# Patient Record
Sex: FEMALE | Race: WHITE | NOT HISPANIC OR LATINO | Employment: OTHER | URBAN - METROPOLITAN AREA
[De-identification: names, ages, dates, MRNs, and addresses within clinical notes are randomized per-mention and may not be internally consistent; named-entity substitution may affect disease eponyms.]

---

## 2020-07-26 ENCOUNTER — APPOINTMENT (OUTPATIENT)
Dept: RADIOLOGY | Facility: IMAGING CENTER | Age: 60
End: 2020-07-26
Attending: NURSE PRACTITIONER
Payer: COMMERCIAL

## 2020-07-26 ENCOUNTER — OFFICE VISIT (OUTPATIENT)
Dept: URGENT CARE | Facility: CLINIC | Age: 60
End: 2020-07-26
Payer: COMMERCIAL

## 2020-07-26 VITALS
RESPIRATION RATE: 14 BRPM | HEART RATE: 68 BPM | HEIGHT: 68 IN | SYSTOLIC BLOOD PRESSURE: 108 MMHG | OXYGEN SATURATION: 98 % | WEIGHT: 154 LBS | DIASTOLIC BLOOD PRESSURE: 66 MMHG | TEMPERATURE: 98 F | BODY MASS INDEX: 23.34 KG/M2

## 2020-07-26 DIAGNOSIS — S96.912A STRAIN OF LEFT FOOT, INITIAL ENCOUNTER: ICD-10-CM

## 2020-07-26 DIAGNOSIS — M79.672 LEFT FOOT PAIN: ICD-10-CM

## 2020-07-26 DIAGNOSIS — T63.301A SPIDER BITE WOUND, ACCIDENTAL OR UNINTENTIONAL, INITIAL ENCOUNTER: ICD-10-CM

## 2020-07-26 PROCEDURE — 73630 X-RAY EXAM OF FOOT: CPT | Mod: TC,LT | Performed by: NURSE PRACTITIONER

## 2020-07-26 PROCEDURE — 99204 OFFICE O/P NEW MOD 45 MIN: CPT | Performed by: NURSE PRACTITIONER

## 2020-07-26 RX ORDER — CITALOPRAM 40 MG/1
40 TABLET ORAL DAILY
COMMUNITY
End: 2020-07-26

## 2020-07-26 RX ORDER — TRIAMCINOLONE ACETONIDE 1 MG/G
1 OINTMENT TOPICAL 2 TIMES DAILY
Qty: 1 G | Refills: 0 | Status: SHIPPED | OUTPATIENT
Start: 2020-07-26 | End: 2023-08-01

## 2020-07-26 RX ORDER — CELECOXIB 400 MG/1
400 CAPSULE ORAL 2 TIMES DAILY
COMMUNITY
End: 2023-07-17

## 2020-07-26 RX ORDER — ESTROGEN,CON/M-PROGEST ACET 0.45-1.5MG
1 TABLET ORAL DAILY
COMMUNITY
End: 2023-07-17

## 2020-07-26 RX ORDER — TOPIRAMATE 100 MG/1
100 TABLET, FILM COATED ORAL 2 TIMES DAILY
COMMUNITY
End: 2023-07-17

## 2020-07-26 RX ORDER — LEVOTHYROXINE SODIUM 88 UG/1
88 TABLET ORAL
COMMUNITY

## 2020-07-26 ASSESSMENT — ENCOUNTER SYMPTOMS
WEAKNESS: 0
EYE REDNESS: 0
NAUSEA: 0
COUGH: 0
SHORTNESS OF BREATH: 0
SORE THROAT: 0
FEVER: 0
ANOREXIA: 0
VOMITING: 0
DIZZINESS: 0
NUMBNESS: 0
CHILLS: 0
MYALGIAS: 0
JOINT SWELLING: 1

## 2020-07-26 NOTE — PROGRESS NOTES
Subjective:     Holly White  is a 60 y.o. female who presents for Foot Swelling (Left)       Foot Swelling   This is a new problem. The current episode started in the past 7 days (step out of RV and rolled ankle). The problem occurs constantly. The problem has been gradually worsening. Associated symptoms include joint swelling and a rash (spider bite left inner thigh happened yesterday ). Pertinent negatives include no anorexia, chest pain, chills, coughing, fever, myalgias, nausea, numbness, sore throat, vomiting or weakness. The symptoms are aggravated by walking. She has tried NSAIDs and ice for the symptoms. The treatment provided no relief.   Rash   This is a new problem. The current episode started yesterday. The problem is unchanged. Location: left leg. The rash is characterized by redness and itchiness. She was exposed to a spider bite. Associated symptoms include joint pain. Pertinent negatives include no anorexia, cough, fever, shortness of breath, sore throat or vomiting. Past treatments include anti-itch cream. The treatment provided no relief. There is no history of allergies or eczema.   History reviewed. No pertinent past medical history.History reviewed. No pertinent surgical history.  Social History     Socioeconomic History   • Marital status:      Spouse name: Not on file   • Number of children: Not on file   • Years of education: Not on file   • Highest education level: Not on file   Occupational History   • Not on file   Social Needs   • Financial resource strain: Not on file   • Food insecurity     Worry: Not on file     Inability: Not on file   • Transportation needs     Medical: Not on file     Non-medical: Not on file   Tobacco Use   • Smoking status: Never Smoker   • Smokeless tobacco: Never Used   Substance and Sexual Activity   • Alcohol use: Yes   • Drug use: Never   • Sexual activity: Not on file   Lifestyle   • Physical activity     Days per week: Not on file     Minutes  "per session: Not on file   • Stress: Not on file   Relationships   • Social connections     Talks on phone: Not on file     Gets together: Not on file     Attends Yazidism service: Not on file     Active member of club or organization: Not on file     Attends meetings of clubs or organizations: Not on file     Relationship status: Not on file   • Intimate partner violence     Fear of current or ex partner: Not on file     Emotionally abused: Not on file     Physically abused: Not on file     Forced sexual activity: Not on file   Other Topics Concern   • Not on file   Social History Narrative   • Not on file    History reviewed. No pertinent family history. Review of Systems   Constitutional: Negative for chills and fever.   HENT: Negative for sore throat.    Eyes: Negative for redness.   Respiratory: Negative for cough and shortness of breath.    Cardiovascular: Negative for chest pain.   Gastrointestinal: Negative for anorexia, nausea and vomiting.   Genitourinary: Negative for dysuria.   Musculoskeletal: Positive for joint pain and joint swelling. Negative for myalgias.   Skin: Positive for itching and rash (spider bite left inner thigh happened yesterday ).   Neurological: Negative for dizziness, weakness and numbness.     Allergies   Allergen Reactions   • Codeine      Other reaction(s): rash,itching   • Oxycodone-Acetaminophen Unspecified   • Nitrofurantoin Unspecified      Objective:   /66 (BP Location: Right arm, Patient Position: Sitting)   Pulse 68   Temp 36.7 °C (98 °F)   Resp 14   Ht 1.727 m (5' 8\")   Wt 69.9 kg (154 lb)   SpO2 98%   BMI 23.42 kg/m²   Physical Exam  Vitals signs and nursing note reviewed.   Constitutional:       General: She is not in acute distress.     Appearance: She is well-developed.   HENT:      Head: Normocephalic and atraumatic.      Right Ear: External ear normal.      Left Ear: External ear normal.      Nose: Nose normal.      Mouth/Throat:      Mouth: Mucous " membranes are moist.   Eyes:      Conjunctiva/sclera: Conjunctivae normal.   Cardiovascular:      Rate and Rhythm: Normal rate.   Pulmonary:      Effort: Pulmonary effort is normal. No respiratory distress.      Breath sounds: Normal breath sounds.   Abdominal:      General: There is no distension.   Musculoskeletal:      Left ankle: She exhibits swelling. She exhibits normal range of motion, no ecchymosis, no deformity and normal pulse. Tenderness. Head of 5th metatarsal tenderness found. No lateral malleolus and no medial malleolus tenderness found. Achilles tendon normal.      Left foot: Decreased range of motion. Normal capillary refill. Tenderness, bony tenderness and swelling present. No crepitus or deformity.        Feet:    Skin:     General: Skin is warm and dry.      Findings: Ecchymosis and erythema present. No abscess, signs of injury or wound. Rash is not pustular or vesicular.          Neurological:      General: No focal deficit present.      Mental Status: She is alert and oriented to person, place, and time. Mental status is at baseline.      Gait: Gait (gait at baseline) normal.   Psychiatric:         Judgment: Judgment normal.           Assessment/Plan:   Assessment    1. Left foot pain  DX-FOOT-COMPLETE 3+ LEFT    REFERRAL TO SPORTS MEDICINE   2. Spider bite wound, accidental or unintentional, initial encounter  triamcinolone acetonide (KENALOG) 0.1 % Ointment   3. Strain of left foot, initial encounter  REFERRAL TO SPORTS MEDICINE       Xray results  No fracture or dislocation of LEFT foot.    Patient will be placed in a walking boot advised weightbearing as tolerated.  Encouraged to rest, ice, elevate when at rest.  Advised may take 600-800 mg ibuprofen 3 times daily as needed for pain.   We will have patient apply topical Kenalog to spider bite.  Did encourage to take antihistamine.  Differential diagnosis, natural history, supportive care, and indications for immediate follow-up discussed.

## 2021-07-18 ENCOUNTER — OFFICE VISIT (OUTPATIENT)
Dept: URGENT CARE | Facility: CLINIC | Age: 61
End: 2021-07-18
Payer: COMMERCIAL

## 2021-07-18 VITALS
HEART RATE: 85 BPM | DIASTOLIC BLOOD PRESSURE: 70 MMHG | RESPIRATION RATE: 16 BRPM | SYSTOLIC BLOOD PRESSURE: 130 MMHG | OXYGEN SATURATION: 97 % | WEIGHT: 152.5 LBS | BODY MASS INDEX: 23.11 KG/M2 | TEMPERATURE: 97.8 F | HEIGHT: 68 IN

## 2021-07-18 DIAGNOSIS — T07.XXXA MULTIPLE ABRASIONS: ICD-10-CM

## 2021-07-18 DIAGNOSIS — R23.4 THIN SKIN: ICD-10-CM

## 2021-07-18 DIAGNOSIS — R23.3 EASY BRUISING: ICD-10-CM

## 2021-07-18 PROBLEM — E78.01 FAMILIAL HYPERCHOLESTEROLEMIA: Status: ACTIVE | Noted: 2021-04-05

## 2021-07-18 PROBLEM — E04.1 NODULAR THYROID DISEASE: Status: ACTIVE | Noted: 2019-08-07

## 2021-07-18 PROBLEM — R10.13 DYSPEPSIA: Status: ACTIVE | Noted: 2021-04-05

## 2021-07-18 PROBLEM — G43.909 MIGRAINE: Status: ACTIVE | Noted: 2021-04-05

## 2021-07-18 PROCEDURE — 99214 OFFICE O/P EST MOD 30 MIN: CPT | Performed by: PHYSICIAN ASSISTANT

## 2021-07-18 RX ORDER — LEVOTHYROXINE SODIUM 88 UG/1
88 TABLET ORAL DAILY
COMMUNITY
Start: 2021-05-24 | End: 2021-08-22

## 2021-07-18 RX ORDER — PHENTERMINE HYDROCHLORIDE 15 MG/1
15 CAPSULE ORAL
COMMUNITY
Start: 2021-05-24 | End: 2023-07-17

## 2021-07-18 RX ORDER — ATORVASTATIN CALCIUM 10 MG/1
TABLET, FILM COATED ORAL DAILY
COMMUNITY
End: 2023-07-17

## 2021-07-18 RX ORDER — CELECOXIB 100 MG/1
100 CAPSULE ORAL
COMMUNITY
Start: 2021-04-05 | End: 2023-07-17

## 2021-07-18 RX ORDER — FLUCONAZOLE 150 MG/1
150 TABLET ORAL ONCE
Qty: 1 TABLET | Refills: 0 | Status: SHIPPED | OUTPATIENT
Start: 2021-07-18 | End: 2021-07-18

## 2021-07-18 RX ORDER — LANSOPRAZOLE 30 MG/1
30 CAPSULE, DELAYED RELEASE ORAL DAILY
COMMUNITY

## 2021-07-18 RX ORDER — LANSOPRAZOLE 30 MG/1
30 CAPSULE, DELAYED RELEASE ORAL
COMMUNITY
Start: 2021-04-05 | End: 2023-07-17

## 2021-07-18 RX ORDER — ROSUVASTATIN CALCIUM 10 MG/1
10 TABLET, COATED ORAL
COMMUNITY
Start: 2021-04-14 | End: 2023-10-12

## 2021-07-18 RX ORDER — TOPIRAMATE 100 MG/1
100 TABLET, FILM COATED ORAL
COMMUNITY
Start: 2021-04-05 | End: 2023-08-01

## 2021-07-18 RX ORDER — FLUOXETINE 10 MG/1
CAPSULE ORAL
COMMUNITY
Start: 2021-02-24 | End: 2023-07-17

## 2021-07-18 RX ORDER — SUMATRIPTAN 50 MG/1
50 TABLET, FILM COATED ORAL
COMMUNITY
Start: 2021-04-05

## 2021-07-18 RX ORDER — PRAVASTATIN SODIUM 20 MG
20 TABLET ORAL DAILY
COMMUNITY
Start: 2021-04-05 | End: 2023-07-17

## 2021-07-18 RX ORDER — FLUOXETINE 10 MG/1
10 TABLET, FILM COATED ORAL DAILY
COMMUNITY
End: 2023-07-17

## 2021-07-18 RX ORDER — ALPRAZOLAM 0.25 MG/1
0.25 TABLET ORAL 3 TIMES DAILY PRN
COMMUNITY
Start: 2021-05-05

## 2021-07-18 RX ORDER — CELECOXIB 200 MG/1
200 CAPSULE ORAL 2 TIMES DAILY
COMMUNITY
Start: 2021-03-26

## 2021-07-18 RX ORDER — CEPHALEXIN 500 MG/1
500 CAPSULE ORAL 3 TIMES DAILY
Qty: 15 CAPSULE | Refills: 0 | Status: SHIPPED | OUTPATIENT
Start: 2021-07-18 | End: 2021-07-23

## 2021-07-18 ASSESSMENT — ENCOUNTER SYMPTOMS
MYALGIAS: 0
DIARRHEA: 0
FOCAL WEAKNESS: 0
BRUISES/BLEEDS EASILY: 1
VOMITING: 0
TINGLING: 0
NAUSEA: 0
WEAKNESS: 0
FEVER: 0
SENSORY CHANGE: 0
ABDOMINAL PAIN: 0
CHILLS: 0

## 2021-07-18 NOTE — PROGRESS NOTES
"Subjective:      Holly White is a 61 y.o. female who presents with Wound Check (wound check on the L arm x yesterday)            The patient is here accompanied by her  with multiple abrasions. Her left lower leg abrasion occurred a few days ago and a left arm abrasion occurred yesterday when she fell against a park bench at a dog park- she states bench/chair was dirty. She is concerned because even hitting objects lightly will cause her to bruise or cause her skin to break. She denies fever or chills. She denies fatigue. She otherwise feels fine. She has noticed more bruising and skin thinning over the past 5 weeks. She denies any other source of bleeding. She does not take anticoagulants. TDAP is UTD.    Past Medical History:   Diagnosis Date   • Anxiety    • Hyperlipidemia    • Migraine    • Thyroid disease        History reviewed. No pertinent surgical history.    History reviewed. No pertinent family history.    Allergies   Allergen Reactions   • Codeine      Other reaction(s): rash,itching   • Oxycodone-Acetaminophen Unspecified   • Nitrofurantoin Unspecified       Medications, Allergies, and current problem list reviewed today in Epic    Review of Systems   Constitutional: Negative for chills, fever and malaise/fatigue.   Cardiovascular: Negative for chest pain.   Gastrointestinal: Negative for abdominal pain, diarrhea, nausea and vomiting.   Genitourinary: Negative for hematuria.   Musculoskeletal: Negative for myalgias.   Skin:        Abrasion to left arm, abrasions to left lower leg   Neurological: Negative for tingling, sensory change, focal weakness and weakness.   Endo/Heme/Allergies: Bruises/bleeds easily.     All other systems reviewed and are negative.        Objective:     /70 (BP Location: Right arm, Patient Position: Sitting)   Pulse 85   Temp 36.6 °C (97.8 °F) (Temporal)   Resp 16   Ht 1.727 m (5' 8\")   Wt 69.2 kg (152 lb 8 oz)   SpO2 97%   BMI 23.19 kg/m²      Physical " Exam  Constitutional:       General: She is not in acute distress.  HENT:      Head: Normocephalic and atraumatic.   Eyes:      Conjunctiva/sclera: Conjunctivae normal.   Cardiovascular:      Rate and Rhythm: Normal rate.   Pulmonary:      Effort: Pulmonary effort is normal. No respiratory distress.   Skin:     General: Skin is warm and dry.      Findings: Abrasion and bruising present. No erythema.          Neurological:      General: No focal deficit present.      Mental Status: She is alert and oriented to person, place, and time.   Psychiatric:         Mood and Affect: Mood normal.         Behavior: Behavior normal.         Thought Content: Thought content normal.         Judgment: Judgment normal.                        Assessment/Plan:        1. Multiple abrasions    Wounds cleanses with Hibiclens, rinsed with saline and ointment and dressings applied.  -Empiric antibiotic due to dirty chair   - cephALEXin (KEFLEX) 500 MG Cap; Take 1 capsule by mouth 3 times a day for 5 days.  Dispense: 15 capsule; Refill: 0  - fluconazole (DIFLUCAN) 150 MG tablet; Take 1 tablet by mouth one time for 1 dose.  Dispense: 1 tablet; Refill: 0    2. Easy bruising    3. Thin skin    - CBC WITH DIFFERENTIAL; Future  - TSH; Future  - IRON/TOTAL IRON BIND; Future    Check above. Follow-up with PCP    Differential diagnoses, Supportive care, and indications for immediate follow-up discussed with patient.   Pathogenesis of diagnosis discussed including typical length and natural progression.   Instructed to return to clinic or nearest emergency department for any change in condition, further concerns, or worsening of symptoms.    The patient demonstrated a good understanding and agreed with the treatment plan.    Lamar Hinojosa P.A.-C.

## 2021-07-21 ENCOUNTER — HOSPITAL ENCOUNTER (OUTPATIENT)
Dept: LAB | Facility: MEDICAL CENTER | Age: 61
End: 2021-07-21
Attending: PHYSICIAN ASSISTANT
Payer: COMMERCIAL

## 2021-07-21 DIAGNOSIS — T07.XXXA MULTIPLE ABRASIONS: ICD-10-CM

## 2021-07-21 LAB
BASOPHILS # BLD AUTO: 0.7 % (ref 0–1.8)
BASOPHILS # BLD: 0.05 K/UL (ref 0–0.12)
EOSINOPHIL # BLD AUTO: 0.03 K/UL (ref 0–0.51)
EOSINOPHIL NFR BLD: 0.4 % (ref 0–6.9)
ERYTHROCYTE [DISTWIDTH] IN BLOOD BY AUTOMATED COUNT: 46.8 FL (ref 35.9–50)
HCT VFR BLD AUTO: 43.5 % (ref 37–47)
HGB BLD-MCNC: 14 G/DL (ref 12–16)
IMM GRANULOCYTES # BLD AUTO: 0.06 K/UL (ref 0–0.11)
IMM GRANULOCYTES NFR BLD AUTO: 0.8 % (ref 0–0.9)
IRON SATN MFR SERPL: 25 % (ref 15–55)
IRON SERPL-MCNC: 92 UG/DL (ref 40–170)
LYMPHOCYTES # BLD AUTO: 1.57 K/UL (ref 1–4.8)
LYMPHOCYTES NFR BLD: 20.8 % (ref 22–41)
MCH RBC QN AUTO: 31.5 PG (ref 27–33)
MCHC RBC AUTO-ENTMCNC: 32.2 G/DL (ref 33.6–35)
MCV RBC AUTO: 97.8 FL (ref 81.4–97.8)
MONOCYTES # BLD AUTO: 0.76 K/UL (ref 0–0.85)
MONOCYTES NFR BLD AUTO: 10.1 % (ref 0–13.4)
NEUTROPHILS # BLD AUTO: 5.09 K/UL (ref 2–7.15)
NEUTROPHILS NFR BLD: 67.2 % (ref 44–72)
NRBC # BLD AUTO: 0 K/UL
NRBC BLD-RTO: 0 /100 WBC
PLATELET # BLD AUTO: 358 K/UL (ref 164–446)
PMV BLD AUTO: 9.3 FL (ref 9–12.9)
RBC # BLD AUTO: 4.45 M/UL (ref 4.2–5.4)
TIBC SERPL-MCNC: 367 UG/DL (ref 250–450)
TSH SERPL DL<=0.005 MIU/L-ACNC: 0.51 UIU/ML (ref 0.38–5.33)
UIBC SERPL-MCNC: 275 UG/DL (ref 110–370)
WBC # BLD AUTO: 7.6 K/UL (ref 4.8–10.8)

## 2021-07-21 PROCEDURE — 85025 COMPLETE CBC W/AUTO DIFF WBC: CPT

## 2021-07-21 PROCEDURE — 36415 COLL VENOUS BLD VENIPUNCTURE: CPT

## 2021-07-21 PROCEDURE — 83540 ASSAY OF IRON: CPT

## 2021-07-21 PROCEDURE — 83550 IRON BINDING TEST: CPT

## 2021-07-21 PROCEDURE — 84443 ASSAY THYROID STIM HORMONE: CPT

## 2021-07-27 ENCOUNTER — HOSPITAL ENCOUNTER (OUTPATIENT)
Dept: LAB | Facility: MEDICAL CENTER | Age: 61
End: 2021-07-27
Attending: INTERNAL MEDICINE
Payer: COMMERCIAL

## 2021-07-27 LAB
APTT PPP: 22.8 SEC (ref 24.7–36)
INR PPP: 0.99 (ref 0.87–1.13)
PROTHROMBIN TIME: 12.8 SEC (ref 12–14.6)

## 2021-07-27 PROCEDURE — 85730 THROMBOPLASTIN TIME PARTIAL: CPT

## 2021-07-27 PROCEDURE — 36415 COLL VENOUS BLD VENIPUNCTURE: CPT

## 2021-07-27 PROCEDURE — 85610 PROTHROMBIN TIME: CPT

## 2021-08-26 NOTE — PROGRESS NOTES
09/0221    Subjective    Chief Complaint:  Easy bruising    HPI:  61 female referred by Dr. Delmar Rodriguez for easy bruising. Had been on Celebrex plus Voltaren gel. Skin tears with the slightest injury. Brings in photos on her phone of impressive erythrematous patches where she has scraped herself or been scratched by her dog. No epistaxis or spontaneous bleeding.     ROS:    Constitutional: No weight loss  Skin: See PI  HENT: No change in eyesight or hearing  Cardiovascular:No chest pain or arrythmia  Respiratory:No cough or SOB  GI:No nausea, vomiting, diarrhea, constipation  :No dysuria or frequency  Musculoskeletal:No bone or joint pain  Neuro:No sx's of neuropathy  Psych: No complaints    PMH:      Allergies   Allergen Reactions   • Codeine      Other reaction(s): rash,itching   • Oxycodone-Acetaminophen Unspecified   • Nitrofurantoin Unspecified       Past Medical History:   Diagnosis Date   • Anxiety    • Hyperlipidemia    • Migraine    • Thyroid disease         History reviewed. No pertinent surgical history.     Medications:    Current Outpatient Medications on File Prior to Visit   Medication Sig Dispense Refill   • lansoprazole (PREVACID) 30 MG CAPSULE DELAYED RELEASE Take 30 mg by mouth every day.     • ALPRAZolam (XANAX) 0.25 MG Tab      • rosuvastatin (CRESTOR) 10 MG Tab      • SUMAtriptan (IMITREX) 50 MG Tab Take 50 mg by mouth.     • celecoxib (CELEBREX) 200 MG Cap Take 1 capsule by mouth 2 times a day. 60 capsule 1   • levothyroxine (LEVOXYL) 88 MCG Tab Take 88 mcg by mouth Every morning on an empty stomach.     • estrogen, conjugated,-medroxyprogesterone (PREMPRO) 0.45-1.5 MG per tablet Take 1 Tab by mouth every day.     • fluoxetine (PROZAC) 10 MG tablet Take 10 mg by mouth every day. (Patient not taking: Reported on 9/2/2021)     • atorvastatin (LIPITOR) 10 MG Tab Take  by mouth every day. (Patient not taking: Reported on 9/2/2021)     • diclofenac sodium 1 % Gel Place 2 g on the skin. (Patient  "not taking: Reported on 7/18/2021)     • phentermine 15 MG capsule Take 15 mg by mouth. (Patient not taking: Reported on 7/18/2021)     • pravastatin (PRAVACHOL) 20 MG Tab Take 20 mg by mouth every day. (Patient not taking: Reported on 7/18/2021)     • celecoxib (CELEBREX) 100 MG Cap Take 100 mg by mouth. (Patient not taking: Reported on 9/2/2021)     • celecoxib (CELEBREX) 200 MG Cap  (Patient not taking: Reported on 9/2/2021)     • FLUoxetine (PROZAC) 10 MG Cap  (Patient not taking: Reported on 9/2/2021)     • lansoprazole (PREVACID) 30 MG CAPSULE DELAYED RELEASE Take 30 mg by mouth. (Patient not taking: Reported on 9/2/2021)     • topiramate (TOPAMAX) 100 MG Tab Take 100 mg by mouth. (Patient not taking: Reported on 7/18/2021)     • topiramate (TOPAMAX) 100 MG Tab Take 100 mg by mouth 2 times a day. (Patient not taking: Reported on 6/9/2021)     • celecoxib (CELEBREX) 400 MG capsule Take 400 mg by mouth 2 times a day. (Patient not taking: Reported on 7/18/2021)     • triamcinolone acetonide (KENALOG) 0.1 % Ointment Apply 1 Application to affected area(s) 2 times a day. (Patient not taking: Reported on 7/18/2021) 1 g 0     No current facility-administered medications on file prior to visit.       Social History     Tobacco Use   • Smoking status: Never Smoker   • Smokeless tobacco: Never Used   Substance Use Topics   • Alcohol use: Never        History reviewed. No pertinent family history.     Objective    Vitals:    /62 (BP Location: Right arm, Patient Position: Sitting, BP Cuff Size: Adult)   Pulse 85   Temp 36.4 °C (97.6 °F) (Temporal)   Resp 16   Ht 1.727 m (5' 7.99\")   Wt 70.4 kg (155 lb 1.5 oz)   SpO2 96%   BMI 23.59 kg/m²     Physical Exam:    Appears well-developed and well-nourished. No distress.    Head -  Normocephalic .   Eyes - Pupils are equal.. Conjunctivae normal. No scleral icterus.   Ears - normal hearing  Neck - Normal range of motion. Neck supple. No thyromegaly  Cardiovascular - " Normal rate, regular rhythm, normal heart sounds and intact distal pulses. No  gallop, murmur or rub  Pulmonary - Normal breath sounds.  No wheeze, rales or rhonci  Breast - Not examined  Abdominal -Soft. No distension, tenderness, organomegaly or mass  Extremities-  No edema or tenderness.    Nodes - No submental, submandibular, preauricular, cervical, axillary or inguinal adenopathy.    Neurological -   Alert and oriented.  Skin - Skin is warm and dry. Numerous areas of erythema and numerous erythematous lesions predominantly on her legs. No rash or abnormality other than on extremities  Psychiatric -  Normal mood and affect.    Labs:    Results for EBONY CURIEL (MRN [id number])   Ref. Range 7/21/2021 09:44   WBC Latest Ref Range: 4.8 - 10.8 K/uL 7.6   RBC Latest Ref Range: 4.20 - 5.40 M/uL 4.45   Hemoglobin Latest Ref Range: 12.0 - 16.0 g/dL 14.0   Hematocrit Latest Ref Range: 37.0 - 47.0 % 43.5   MCV Latest Ref Range: 81.4 - 97.8 fL 97.8   MCH Latest Ref Range: 27.0 - 33.0 pg 31.5   MCHC Latest Ref Range: 33.6 - 35.0 g/dL 32.2 (L)   RDW Latest Ref Range: 35.9 - 50.0 fL 46.8   Platelet Count Latest Ref Range: 164 - 446 K/uL 358   Results for EBONY CURIEL (MRN [id number])    Ref. Range 7/27/2021 15:30   PT Latest Ref Range: 12.0 - 14.6 sec 12.8   INR Latest Ref Range: 0.87 - 1.13  0.99   APTT Latest Ref Range: 24.7 - 36.0 sec 22.8 (L)     Assessment  I think it is more likely a skin issue or an immunologic issue than a bleeding diathesis. Will do some lab to r/o the latter.  Imp:    Visit Diagnosis:    1. Easy bruising  Prothrombin Time    APTT    [facility] PROFILE    CBC WITH DIFFERENTIAL    ROBERT REFLEXIVE PROFILE         Plan:  Above lab  Derm referral]  If ROBERT positive would need a rheumatology consultation      Pierre Max M.D.

## 2021-09-02 ENCOUNTER — OFFICE VISIT (OUTPATIENT)
Dept: HEMATOLOGY ONCOLOGY | Facility: MEDICAL CENTER | Age: 61
End: 2021-09-02
Payer: COMMERCIAL

## 2021-09-02 VITALS
RESPIRATION RATE: 16 BRPM | SYSTOLIC BLOOD PRESSURE: 110 MMHG | OXYGEN SATURATION: 96 % | HEIGHT: 68 IN | TEMPERATURE: 97.6 F | DIASTOLIC BLOOD PRESSURE: 62 MMHG | BODY MASS INDEX: 23.51 KG/M2 | HEART RATE: 85 BPM | WEIGHT: 155.09 LBS

## 2021-09-02 DIAGNOSIS — R23.3 EASY BRUISING: ICD-10-CM

## 2021-09-02 PROCEDURE — 99203 OFFICE O/P NEW LOW 30 MIN: CPT | Performed by: INTERNAL MEDICINE

## 2021-09-02 ASSESSMENT — PATIENT HEALTH QUESTIONNAIRE - PHQ9: CLINICAL INTERPRETATION OF PHQ2 SCORE: 0

## 2021-09-02 ASSESSMENT — PAIN SCALES - GENERAL: PAINLEVEL: NO PAIN

## 2021-09-03 ENCOUNTER — HOSPITAL ENCOUNTER (OUTPATIENT)
Dept: LAB | Facility: MEDICAL CENTER | Age: 61
End: 2021-09-03
Attending: INTERNAL MEDICINE
Payer: COMMERCIAL

## 2021-09-03 DIAGNOSIS — R23.3 EASY BRUISING: ICD-10-CM

## 2021-09-03 LAB
APTT PPP: 24.9 SEC (ref 24.7–36)
INR PPP: 0.86 (ref 0.87–1.13)
PROTHROMBIN TIME: 11.5 SEC (ref 12–14.6)

## 2021-09-03 PROCEDURE — 86039 ANTINUCLEAR ANTIBODIES (ANA): CPT

## 2021-09-03 PROCEDURE — 85245 CLOT FACTOR VIII VW RISTOCTN: CPT

## 2021-09-03 PROCEDURE — 85610 PROTHROMBIN TIME: CPT

## 2021-09-03 PROCEDURE — 85025 COMPLETE CBC W/AUTO DIFF WBC: CPT

## 2021-09-03 PROCEDURE — 85240 CLOT FACTOR VIII AHG 1 STAGE: CPT

## 2021-09-03 PROCEDURE — 86225 DNA ANTIBODY NATIVE: CPT

## 2021-09-03 PROCEDURE — 36415 COLL VENOUS BLD VENIPUNCTURE: CPT

## 2021-09-03 PROCEDURE — 86235 NUCLEAR ANTIGEN ANTIBODY: CPT | Mod: 91

## 2021-09-03 PROCEDURE — 85730 THROMBOPLASTIN TIME PARTIAL: CPT

## 2021-09-03 PROCEDURE — 86038 ANTINUCLEAR ANTIBODIES: CPT

## 2021-09-03 PROCEDURE — 85246 CLOT FACTOR VIII VW ANTIGEN: CPT

## 2021-09-04 LAB
BASOPHILS # BLD AUTO: 0.4 % (ref 0–1.8)
BASOPHILS # BLD: 0.04 K/UL (ref 0–0.12)
EOSINOPHIL # BLD AUTO: 0.03 K/UL (ref 0–0.51)
EOSINOPHIL NFR BLD: 0.3 % (ref 0–6.9)
ERYTHROCYTE [DISTWIDTH] IN BLOOD BY AUTOMATED COUNT: 47.8 FL (ref 35.9–50)
HCT VFR BLD AUTO: 41.8 % (ref 37–47)
HGB BLD-MCNC: 13.4 G/DL (ref 12–16)
IMM GRANULOCYTES # BLD AUTO: 0.06 K/UL (ref 0–0.11)
IMM GRANULOCYTES NFR BLD AUTO: 0.6 % (ref 0–0.9)
LYMPHOCYTES # BLD AUTO: 1.81 K/UL (ref 1–4.8)
LYMPHOCYTES NFR BLD: 16.8 % (ref 22–41)
MCH RBC QN AUTO: 31.5 PG (ref 27–33)
MCHC RBC AUTO-ENTMCNC: 32.1 G/DL (ref 33.6–35)
MCV RBC AUTO: 98.4 FL (ref 81.4–97.8)
MONOCYTES # BLD AUTO: 0.75 K/UL (ref 0–0.85)
MONOCYTES NFR BLD AUTO: 7 % (ref 0–13.4)
NEUTROPHILS # BLD AUTO: 8.1 K/UL (ref 2–7.15)
NEUTROPHILS NFR BLD: 74.9 % (ref 44–72)
NRBC # BLD AUTO: 0 K/UL
NRBC BLD-RTO: 0 /100 WBC
PLATELET # BLD AUTO: 358 K/UL (ref 164–446)
PMV BLD AUTO: 9.9 FL (ref 9–12.9)
RBC # BLD AUTO: 4.25 M/UL (ref 4.2–5.4)
WBC # BLD AUTO: 10.8 K/UL (ref 4.8–10.8)

## 2021-09-05 LAB — NUCLEAR IGG SER QL IA: DETECTED

## 2021-09-07 ENCOUNTER — TELEPHONE (OUTPATIENT)
Dept: HEMATOLOGY ONCOLOGY | Facility: MEDICAL CENTER | Age: 61
End: 2021-09-07

## 2021-09-07 NOTE — TELEPHONE ENCOUNTER
Patient called stating [facility] but is unsure what her next steps would be as she does not have a follow up appointment in our office scheduled. Please call patient back with recommendations and next steps.

## 2021-09-08 LAB
ANA INTERPRETIVE COMMENT Q5143: ABNORMAL
ANA PATTERN Q5144: ABNORMAL
ANA TITER Q5145: ABNORMAL
ANTINUCLEAR ANTIBODY (ANA), HEP-2, IGG Q5142: DETECTED
FACT VIII ACT/NOR PPP: 119 % (ref 56–191)
VWF AG ACT/NOR PPP IA: 164 % (ref 52–214)
VWF:RCO ACT/NOR PPP PL AGG: 117 % (ref 51–215)

## 2021-09-09 LAB
DSDNA AB TITR SER CLIF: 5 IU (ref 0–24)
ENA JO1 AB TITR SER: 0 AU/ML (ref 0–40)
ENA SCL70 IGG SER QL: 9 AU/ML (ref 0–40)
ENA SM IGG SER-ACNC: 1 AU/ML (ref 0–40)
ENA SS-B IGG SER IA-ACNC: 4 AU/ML (ref 0–40)
SSA52 R0ENA AB IGG Q0420: 78 AU/ML (ref 0–40)
SSA60 R0ENA AB IGG Q0419: 32 AU/ML (ref 0–40)

## 2021-09-09 NOTE — TELEPHONE ENCOUNTER
Pt called into the clinic stating they had a missed call from Dr. Max. Informed pt Dr. Max is not available and would relay a message to his medical assistant. Pt acknowledged and would like a call back.

## 2021-09-11 LAB — U1 SNRNP IGG SER QL: 2 UNITS (ref 0–19)

## 2023-07-17 PROBLEM — M17.9 OSTEOARTHRITIS, KNEE: Status: ACTIVE | Noted: 2023-07-17

## 2023-08-07 PROBLEM — M17.12 PRIMARY OSTEOARTHRITIS OF LEFT KNEE: Status: ACTIVE | Noted: 2023-07-17
